# Patient Record
Sex: FEMALE | Race: BLACK OR AFRICAN AMERICAN | NOT HISPANIC OR LATINO | URBAN - METROPOLITAN AREA
[De-identification: names, ages, dates, MRNs, and addresses within clinical notes are randomized per-mention and may not be internally consistent; named-entity substitution may affect disease eponyms.]

---

## 2017-08-11 ENCOUNTER — HISTORICAL (OUTPATIENT)
Dept: ADMINISTRATIVE | Facility: HOSPITAL | Age: 22
End: 2017-08-11

## 2017-08-13 LAB — FINAL CULTURE: NORMAL

## 2019-07-15 ENCOUNTER — HOSPITAL ENCOUNTER (OUTPATIENT)
Dept: ADMINISTRATIVE | Facility: HOSPITAL | Age: 24
End: 2019-07-19
Attending: INTERNAL MEDICINE | Admitting: INTERNAL MEDICINE

## 2019-07-15 LAB
ABS NEUT (OLG): 17.22 X10(3)/MCL (ref 2.1–9.2)
ALBUMIN SERPL-MCNC: 3.2 GM/DL (ref 3.4–5)
ALBUMIN/GLOB SERPL: 0.6 RATIO (ref 1.1–2)
ALP SERPL-CCNC: 196 UNIT/L (ref 38–126)
ALT SERPL-CCNC: 23 UNIT/L (ref 12–78)
APPEARANCE, UA: CLEAR
AST SERPL-CCNC: 14 UNIT/L (ref 15–37)
B-HCG SERPL QL: NEGATIVE
BACTERIA SPEC CULT: ABNORMAL /HPF
BASOPHILS # BLD AUTO: 0 X10(3)/MCL (ref 0–0.2)
BASOPHILS NFR BLD AUTO: 0 %
BILIRUB SERPL-MCNC: 0.3 MG/DL (ref 0.2–1)
BILIRUB UR QL STRIP: NEGATIVE
BILIRUBIN DIRECT+TOT PNL SERPL-MCNC: 0.1 MG/DL (ref 0–0.5)
BILIRUBIN DIRECT+TOT PNL SERPL-MCNC: 0.2 MG/DL (ref 0–0.8)
BNP BLD-MCNC: 11 PG/ML (ref 0–125)
BUN SERPL-MCNC: 9 MG/DL (ref 7–18)
CALCIUM SERPL-MCNC: 9.7 MG/DL (ref 8.5–10.1)
CHLORIDE SERPL-SCNC: 102 MMOL/L (ref 98–107)
CO2 SERPL-SCNC: 31 MMOL/L (ref 21–32)
COLOR UR: YELLOW
CREAT SERPL-MCNC: 0.73 MG/DL (ref 0.55–1.02)
EOSINOPHIL # BLD AUTO: 0.1 X10(3)/MCL (ref 0–0.9)
EOSINOPHIL NFR BLD AUTO: 0 %
ERYTHROCYTE [DISTWIDTH] IN BLOOD BY AUTOMATED COUNT: 13.5 % (ref 11.5–17)
GLOBULIN SER-MCNC: 5.1 GM/DL (ref 2.4–3.5)
GLUCOSE (UA): NEGATIVE
GLUCOSE SERPL-MCNC: 87 MG/DL (ref 74–106)
HCT VFR BLD AUTO: 37.4 % (ref 37–47)
HGB BLD-MCNC: 11.4 GM/DL (ref 12–16)
HGB UR QL STRIP: NEGATIVE
KETONES UR QL STRIP: NEGATIVE
LEUKOCYTE ESTERASE UR QL STRIP: ABNORMAL
LIPASE SERPL-CCNC: 88 UNIT/L (ref 73–393)
LYMPHOCYTES # BLD AUTO: 1.9 X10(3)/MCL (ref 0.6–4.6)
LYMPHOCYTES NFR BLD AUTO: 9 %
MCH RBC QN AUTO: 24.9 PG (ref 27–31)
MCHC RBC AUTO-ENTMCNC: 30.5 GM/DL (ref 33–36)
MCV RBC AUTO: 81.8 FL (ref 80–94)
MONOCYTES # BLD AUTO: 1 X10(3)/MCL (ref 0.1–1.3)
MONOCYTES NFR BLD AUTO: 5 %
NEUTROPHILS # BLD AUTO: 17.22 X10(3)/MCL (ref 2.1–9.2)
NEUTROPHILS NFR BLD AUTO: 85 %
NITRITE UR QL STRIP: NEGATIVE
PH UR STRIP: 5.5 [PH] (ref 5–9)
PLATELET # BLD AUTO: 481 X10(3)/MCL (ref 130–400)
PMV BLD AUTO: 9.5 FL (ref 9.4–12.4)
POTASSIUM SERPL-SCNC: 3.8 MMOL/L (ref 3.5–5.1)
PROT SERPL-MCNC: 8.3 GM/DL (ref 6.4–8.2)
PROT UR QL STRIP: NEGATIVE
RBC # BLD AUTO: 4.57 X10(6)/MCL (ref 4.2–5.4)
RBC #/AREA URNS HPF: ABNORMAL /[HPF]
SODIUM SERPL-SCNC: 137 MMOL/L (ref 136–145)
SP GR UR STRIP: 1.02 (ref 1–1.03)
SQUAMOUS EPITHELIAL, UA: ABNORMAL
TSH SERPL-ACNC: 1.38 MIU/L (ref 0.36–3.74)
UROBILINOGEN UR STRIP-ACNC: 0.2
WBC # SPEC AUTO: 20.3 X10(3)/MCL (ref 4.5–11.5)
WBC #/AREA URNS HPF: ABNORMAL /[HPF]

## 2019-07-16 ENCOUNTER — HISTORICAL (OUTPATIENT)
Dept: LAB | Facility: HOSPITAL | Age: 24
End: 2019-07-16

## 2019-07-16 LAB
APTT PPP: 37 SECOND(S) (ref 24.2–33.9)
GLUCOSE FLD-MCNC: 79 MG/DL
INR PPP: 1.2 (ref 0–1.3)
LDH FLD-CCNC: 457 U/L
LDH SERPL-CCNC: 453 UNIT/L (ref 84–246)
PH BF TYPE: NORMAL
PH FLD: 7.78 [PH]
PLATELET # BLD AUTO: 451 X10(3)/MCL (ref 130–400)
PROT FLD-MCNC: 5 GM/DL
PROT SERPL-MCNC: 7.7 GM/DL (ref 6.4–8.2)
PROTHROMBIN TIME: 15.2 SECOND(S) (ref 12–14)

## 2019-07-17 LAB
ABS NEUT (OLG): 16.96 X10(3)/MCL (ref 2.1–9.2)
ALBUMIN SERPL-MCNC: 2.9 GM/DL (ref 3.4–5)
ALBUMIN/GLOB SERPL: 0.7 RATIO (ref 1.1–2)
ALP SERPL-CCNC: 156 UNIT/L (ref 38–126)
ALT SERPL-CCNC: 16 UNIT/L (ref 12–78)
APTT PPP: 37.2 SECOND(S) (ref 24.2–33.9)
AST SERPL-CCNC: 19 UNIT/L (ref 15–37)
BASOPHILS # BLD AUTO: 0 X10(3)/MCL (ref 0–0.2)
BASOPHILS NFR BLD AUTO: 0 %
BILIRUB SERPL-MCNC: 0.3 MG/DL (ref 0.2–1)
BILIRUBIN DIRECT+TOT PNL SERPL-MCNC: 0.1 MG/DL (ref 0–0.5)
BILIRUBIN DIRECT+TOT PNL SERPL-MCNC: 0.2 MG/DL (ref 0–0.8)
BUN SERPL-MCNC: 9 MG/DL (ref 7–18)
CALCIUM SERPL-MCNC: 8.8 MG/DL (ref 8.5–10.1)
CHLORIDE SERPL-SCNC: 105 MMOL/L (ref 98–107)
CO2 SERPL-SCNC: 24 MMOL/L (ref 21–32)
CREAT SERPL-MCNC: 0.68 MG/DL (ref 0.55–1.02)
EOSINOPHIL # BLD AUTO: 0.1 X10(3)/MCL (ref 0–0.9)
EOSINOPHIL NFR BLD AUTO: 0 %
ERYTHROCYTE [DISTWIDTH] IN BLOOD BY AUTOMATED COUNT: 13.6 % (ref 11.5–17)
GLOBULIN SER-MCNC: 4.2 GM/DL (ref 2.4–3.5)
GLUCOSE SERPL-MCNC: 80 MG/DL (ref 74–106)
GRAM STN SPEC: NORMAL
HCT VFR BLD AUTO: 33.8 % (ref 37–47)
HGB BLD-MCNC: 10.6 GM/DL (ref 12–16)
INR PPP: 1.2 (ref 0–1.3)
LYMPHOCYTES # BLD AUTO: 1.6 X10(3)/MCL (ref 0.6–4.6)
LYMPHOCYTES NFR BLD AUTO: 8 %
MAGNESIUM SERPL-MCNC: 2.1 MG/DL (ref 1.8–2.4)
MCH RBC QN AUTO: 25.1 PG (ref 27–31)
MCHC RBC AUTO-ENTMCNC: 31.4 GM/DL (ref 33–36)
MCV RBC AUTO: 79.9 FL (ref 80–94)
MONOCYTES # BLD AUTO: 1 X10(3)/MCL (ref 0.1–1.3)
MONOCYTES NFR BLD AUTO: 5 %
NEUTROPHILS # BLD AUTO: 16.96 X10(3)/MCL (ref 2.1–9.2)
NEUTROPHILS NFR BLD AUTO: 86 %
PHOSPHATE SERPL-MCNC: 4.9 MG/DL (ref 2.5–4.9)
PLATELET # BLD AUTO: 412 X10(3)/MCL (ref 130–400)
PMV BLD AUTO: 9.6 FL (ref 9.4–12.4)
POTASSIUM SERPL-SCNC: 4.6 MMOL/L (ref 3.5–5.1)
PROT SERPL-MCNC: 7.1 GM/DL (ref 6.4–8.2)
PROTHROMBIN TIME: 15.7 SECOND(S) (ref 12–14)
RBC # BLD AUTO: 4.23 X10(6)/MCL (ref 4.2–5.4)
SODIUM SERPL-SCNC: 138 MMOL/L (ref 136–145)
WBC # SPEC AUTO: 19.8 X10(3)/MCL (ref 4.5–11.5)

## 2019-07-18 LAB
ABS NEUT (OLG): 18.15 X10(3)/MCL (ref 2.1–9.2)
BASOPHILS # BLD AUTO: 0 X10(3)/MCL (ref 0–0.2)
BASOPHILS NFR BLD AUTO: 0 %
EOSINOPHIL # BLD AUTO: 0.2 X10(3)/MCL (ref 0–0.9)
EOSINOPHIL NFR BLD AUTO: 1 %
ERYTHROCYTE [DISTWIDTH] IN BLOOD BY AUTOMATED COUNT: 13.3 % (ref 11.5–17)
HCT VFR BLD AUTO: 34.4 % (ref 37–47)
HGB BLD-MCNC: 10.5 GM/DL (ref 12–16)
LYMPHOCYTES # BLD AUTO: 1.7 X10(3)/MCL (ref 0.6–4.6)
LYMPHOCYTES NFR BLD AUTO: 8 %
MCH RBC QN AUTO: 25 PG (ref 27–31)
MCHC RBC AUTO-ENTMCNC: 30.5 GM/DL (ref 33–36)
MCV RBC AUTO: 81.9 FL (ref 80–94)
MONOCYTES # BLD AUTO: 1.1 X10(3)/MCL (ref 0.1–1.3)
MONOCYTES NFR BLD AUTO: 5 %
NEUTROPHILS # BLD AUTO: 18.15 X10(3)/MCL (ref 2.1–9.2)
NEUTROPHILS NFR BLD AUTO: 85 %
PLATELET # BLD AUTO: 442 X10(3)/MCL (ref 130–400)
PMV BLD AUTO: 9.9 FL (ref 9.4–12.4)
RBC # BLD AUTO: 4.2 X10(6)/MCL (ref 4.2–5.4)
WBC # SPEC AUTO: 21.3 X10(3)/MCL (ref 4.5–11.5)

## 2019-07-19 LAB
ABS NEUT (OLG): 16.58 X10(3)/MCL (ref 2.1–9.2)
BASOPHILS # BLD AUTO: 0 X10(3)/MCL (ref 0–0.2)
BASOPHILS NFR BLD AUTO: 0 %
EOSINOPHIL # BLD AUTO: 0.2 X10(3)/MCL (ref 0–0.9)
EOSINOPHIL NFR BLD AUTO: 1 %
ERYTHROCYTE [DISTWIDTH] IN BLOOD BY AUTOMATED COUNT: 13.5 % (ref 11.5–17)
ERYTHROCYTE [SEDIMENTATION RATE] IN BLOOD: 53 MM/HR (ref 0–20)
FINAL CULTURE: NORMAL
HAV IGM SERPL QL IA: NEGATIVE
HBV CORE IGM SERPL QL IA: NEGATIVE
HBV SURFACE AG SERPL QL IA: NEGATIVE
HCT VFR BLD AUTO: 35.1 % (ref 37–47)
HCV AB SERPL QL IA: NEGATIVE
HEPATITIS PANEL INTERP: NORMAL
HGB BLD-MCNC: 10.7 GM/DL (ref 12–16)
HIV 1+2 AB+HIV1 P24 AG SERPL QL IA: NEGATIVE
LYMPHOCYTES # BLD AUTO: 1.9 X10(3)/MCL (ref 0.6–4.6)
LYMPHOCYTES NFR BLD AUTO: 9 %
MCH RBC QN AUTO: 24.7 PG (ref 27–31)
MCHC RBC AUTO-ENTMCNC: 30.5 GM/DL (ref 33–36)
MCV RBC AUTO: 80.9 FL (ref 80–94)
MONOCYTES # BLD AUTO: 1 X10(3)/MCL (ref 0.1–1.3)
MONOCYTES NFR BLD AUTO: 5 %
NEUTROPHILS # BLD AUTO: 16.58 X10(3)/MCL (ref 2.1–9.2)
NEUTROPHILS NFR BLD AUTO: 84 %
PLATELET # BLD AUTO: 462 X10(3)/MCL (ref 130–400)
PMV BLD AUTO: 9.5 FL (ref 9.4–12.4)
RBC # BLD AUTO: 4.34 X10(6)/MCL (ref 4.2–5.4)
WBC # SPEC AUTO: 19.7 X10(3)/MCL (ref 4.5–11.5)

## 2019-07-21 LAB
FINAL CULTURE: NORMAL

## 2019-08-19 LAB — FINAL CULTURE: NORMAL

## 2022-04-10 ENCOUNTER — HISTORICAL (OUTPATIENT)
Dept: ADMINISTRATIVE | Facility: HOSPITAL | Age: 27
End: 2022-04-10

## 2022-04-11 ENCOUNTER — HISTORICAL (OUTPATIENT)
Dept: ADMINISTRATIVE | Facility: HOSPITAL | Age: 27
End: 2022-04-11

## 2022-04-28 VITALS
SYSTOLIC BLOOD PRESSURE: 121 MMHG | OXYGEN SATURATION: 98 % | DIASTOLIC BLOOD PRESSURE: 62 MMHG | BODY MASS INDEX: 23.25 KG/M2 | WEIGHT: 157 LBS | HEIGHT: 69 IN

## 2022-04-28 VITALS
BODY MASS INDEX: 23.25 KG/M2 | SYSTOLIC BLOOD PRESSURE: 121 MMHG | DIASTOLIC BLOOD PRESSURE: 62 MMHG | HEIGHT: 69 IN | OXYGEN SATURATION: 98 % | WEIGHT: 157 LBS

## 2022-04-30 NOTE — CONSULTS
Patient:   Milady Brown            MRN: [id number]            FIN: [id number]               Age:   24 years     Sex:  Female     :  1995   Associated Diagnoses:   None   Author:   Konstantin Armenta MD      Basic Information   24-year-old black female with no significant medical history presented to the ER 7/15/19 with a one-week history of left sided lower back pain and shortness of breath with activity. She denied any fever, chills, night sweats or weight loss. She denied cough or sputum production. UPT was negative. CT of the abdomen and pelvis showed a large left-sided pleural effusion and small pericardial effusion. There was a large left para-aortic density measuring 5.8 x 6 cm felt to represent pathologic adenopathy. There was also extensive adenopathy in the mesentery. CT of the chest showed prominent adenopathy in the lower neck, supraclavicular, left subpectoral and bilateral axilla. Representative right neck node measured 2.8 x 1.2 cm, left supraclavicular 3.3 x 2.1 cm, left subpectoral 4.2 x 2.6 cm and left axillary 3.2 x 2.5 cm. There was significant mediastinal adenopathy and also gastrohepatic adenopathy. There were several hypodense splenic lesions. Laboratory on admission showed a leukocytosis with a white count of 20.3 with a left shifted differential and mild anemia with a hemoglobin of 11.4. LDH level was significantly elevated at 453 u/l. She underwent a left thoracentesis 19 with removal of 1100 mL of non-cloudy yellow fluid. Chemistries showed a lymphocyte predominant exudative pleural fluid with negative cytology. CT-guided biopsy of a left retroperitoneal node 19 was positive for Hodgkin lymphoma, classical type. Due to the small sample size, further subclassification could not be performed. Echocardiogram 19 showed a very small pericardial effusion and normal left ventricular function with an ejection fraction of 55-60%. I was consulted for a Medical Oncology  opinion.    Patient sitting up on the side of bed. She feels much better following thoracentesis. She is not having any further chest pain. She thoroughly denies any B symptoms. She felt perfectly fine until approximately one week ago. 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      Chief Complaint   Left-sided chest pain and shortness of breath      Review of Systems   Constitutional:  No significant weight loss, No fever, No chills, No sweats, No weakness, No fatigue.    Eye:  No icterus, No blurring, No double vision.    Ear/Nose/Mouth/Throat:  No nasal congestion, No sore throat.    Respiratory:  No shortness of breath, No cough, No sputum production, No hemoptysis, No wheezing.    Cardiovascular:  No chest pain, No palpitations, No tachycardia.    Gastrointestinal:  No nausea, No vomiting, No diarrhea, No constipation, No abdominal pain.    Genitourinary:  No dysuria, No hematuria, No change in urine stream.    Hematology/Lymphatics:  No bruising tendency, No bleeding tendency, No swollen lymph glands.    Musculoskeletal:  No lower extremity swelling, No back pain, No joint pain.    Integumentary:  No rash, No pruritus, No skin lesion.    Neurologic:  Alert and oriented X4, No abnormal balance, No confusion, No numbness, No tingling.    Psychiatric:  No anxiety, No depression.      PMHX:  ? Sjogren syndrome (recent diagnosis - rheumatology appt pending), ADHD on Adderall    PSHX:  No previous surgeries. Lost tip of right fifth finger age 6 due to an accident    SH:  Lifetime nonsmoker, occasional social alcohol use. Lives with her mother and 2-year-old son in [place]. 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    FH:  Her maternal grandmother and grandfather both had colon cancer.      Health Status   Allergies:    Allergic Reactions (Selected)  Severity Not Documented  Penicillins- Unk.   Current medications:    Medications (5) Active  Scheduled: (1)  pantoprazole 40 mg EC Tab   40 mg 1 tab(s), Oral, Daily  Continuous: (1)  sodium chloride 0.9% 1,000 mL  Bolus  1,000 mL, IV  PRN: (3)  hydrALAzine 20 mg/ml Inj- 1 mL  10 mg 0.5 mL, IV, q4hr  lactulose 10 g/15 mL Syr UD  20 gm 30 mL, Oral, TID  ondansetron 2 mg/mL inj - 2mL  4 mg 2 mL, IV Push, q4hr        Physical Examination      Vital Signs (last 24 hrs)_____  Last Charted___________  Temp Oral     37 DegC  (JUL 19 07:28)  Heart Rate Peripheral  96 bpm  (JUL 19 07:28)  Resp Rate         H 48br/min  (JUL 19 06:00)  SBP      105 mmHg  (JUL 19 07:28)  DBP      71 mmHg  (JUL 19 07:28)  SpO2      100 %  (JUL 19 07:28)     General:  Alert and oriented, Well-developed, well-nourished healthy-appearing young black female in no acute distress.    Eye:  Pupils are equal, round and reactive to light, Extraocular movements are intact, Normal conjunctiva, Sclera nonicteric.    HENT:  Normocephalic, Oral mucosa is moist, No pharyngeal erythema.    Neck:  Supple, Non-tender, Bilateral 2-3 cm cervical and supraclavicular nodes.    Respiratory:  Decreased breath sounds and dullness to percussion left base, otherwise clear. Right lung clear. No wheezes.    Cardiovascular:  Regular rhythm, No murmur, Slightly tachycardic.    Gastrointestinal:  Soft, Non-tender, Non-distended, Normal bowel sounds, No palpable masses or splenomegaly.    Lymphatics:  Bilateral cervical and supra-clavicular adenopathy as above, 2 cm left axillary node. No right axillary or inguinal palpable adenopathy.    Musculoskeletal:  Normal range of motion, No tenderness, No lower extremity edema.    Integumentary:  Warm, Dry, No rash.    Neurologic:  Alert, Oriented, Normal sensory, Normal motor function, No focal deficits.       Review / Management   Laboratory Results   Last 10 Days Lab Results : PowerNote Discrete Results   7/17/2019 4:25 CDT       WBC                       19.8 x10(3)/mcL  HI                             RBC                       4.23 x10(6)/mcL                              Hgb                       10.6 gm/dL  LOW                             Hct                       33.8 %  LOW                             Platelet                  412 x10(3)/mcL  HI                             MCV                       79.9 fL  LOW                             MCH                       25.1 pg  LOW                             MCHC                      31.4 gm/dL  LOW                             RDW                       13.6 %                             MPV                       9.6 fL                             Abs Neut                  16.96 x10(3)/mcL  HI                             Neutro Auto               86 %  NA                             Lymph Auto                8 %  NA                             Mono Auto                 5 %  NA                             Eos Auto                  0 %  NA                             Abs Eos                   0.1 x10(3)/mcL                             Basophil Auto             0 %  NA                             Abs Neutro                16.96 x10(3)/mcL  HI                             Abs Lymph                 1.6 x10(3)/mcL                             Abs Mono                  1.0 x10(3)/mcL                             Abs Baso                  0.0 x10(3)/mcL                             PT                        15.7 second(s)  HI                             INR                       1.2                             PTT                       37.2 second(s)  HI                             Sodium Lvl                138 mmol/L                             Potassium Lvl             4.6 mmol/L                             Chloride                  105 mmol/L                             CO2                       24.0 mmol/L                             Calcium Lvl               8.8 mg/dL                             Magnesium Lvl             2.1 mg/dL                             Glucose Lvl               80 mg/dL                             BUN                        9.0 mg/dL                             Creatinine                0.68 mg/dL                             eGFR-AA                   >60 mL/min/1.73 m2  NA                             eGFR-LILLIAM                  >60 mL/min/1.73 m2  NA                             Bili Total                0.3 mg/dL                             Bili Direct               0.10 mg/dL                             Bili Indirect             0.20 mg/dL                             AST                       19 unit/L                             ALT                       16 unit/L                             Alk Phos                  156 unit/L  HI                             Total Protein             7.1 gm/dL                             Albumin Lvl               2.90 gm/dL  LOW                             Globulin                  4.20 gm/dL  HI                             A/G Ratio                 0.7 ratio  LOW                             Phosphorus                4.9 mg/dL    7/16/2019 23:56 CDT      Urine Culture             Review  (In Progress)   7/16/2019 11:02 CDT      Total Protein             7.7 gm/dL                             LDH                       453 unit/L  HI    7/16/2019 8:33 CDT       Gluc BF Type              Thoracent fld                             Glucose BF                79 mg/dL  NA                             LDH BF Type               Thoracent fld                             LDH BF                    457  NA                             pH BF Type                Pleural fluid                             pH BF                     7.78  NA                             Prot BF Type              Pleural fluid                             Protein BF                5.0 gm/dL  NA        Final Diagnosis  LEFT RETROPERITONEAL LYMPH NODE, CT GUIDED FINE NEEDLE BIOPSY:    HODGKIN LYMPHOMA, CLASSICAL TYPE (SEE COMMENT).     IMMUNOPHENOTYPE OF HODGKIN CELLS:       Positive for CD15, CD30 and PAX-5, negative for  "CD45.    IMMUNOREACTION RESULTS (performed on block 1B):    CD15:  Positive in Hodgkin cells.   CD30:  Positive in Hodgkin cells.  PAX-5:  Positive in Hodgkin cells.   CD45:  Negative in Hodgkin cells.     IMMUNOREACTION RESULTS (performed on block 1A):     CD3:  Highlights background T cell population.   CD5:  Staining pattern roughly parallels that of CD3.   CD20: Highlights rare background small lymphocytes.   CD10:  Highlights rare small lymphocytes.     Control reactions are appropriate.    COMMENT:      A.  The small size of the biopsy precludes further subclassification.  However, nodular sclerosis variant is favored.    B.  This case was discussed with Dr. Sanderson on 7/18/2019.     C.  Intradepartmental consult case.     CODE 9      *Electronically Signed*     Mj Hurst MD  Verified: 07/18/19 16:16        Specimen Description  1 Left Retroperitoneal Lymph Node    Clinical Information       Pre-Operative Diagnosis:     lt retroperitoneal lymph node    Intraoperative Diagnosis  ATYPICAL CELLS PRESENT.  RECOMMEND ADDITIONAL CORE BIOPSIES FOR HISTOLOGIC / IMMUNOHISTOCHEMICAL EVALUATION./ FRANK     (TWO SMEARS)  15:08-15:18      Consult Performed By:  FRANK/CM    Gross Description  Received for immediate read diagnosis are two smears. One air dried which submitted for Hull stain and used for immediate read diagnosis. One alcohol fix which is submitted for Pap stain and used for immediate read diagnosis. Additionally received is RPMI solution with cylindrical cores which are submitted for flow cytometry.  Additionally received labeled "CT biopsy left retroperitoneal lymph node, Milady Brown, [id number]" are multiple cylindrical cores and core pieces of gray-white tissue measuring 0.6 x less than 0.1 x less than 0.1 cm in aggregate. The specimen is placed on filter paper and entirely submitted in three cassettes labeled [id number], 1B, and 1C.  0-f-3 conserve all tissue.      RLM/ANA    Microscopic Description  Microscopic " examination performed.  Please see diagnosis.         Impression and Plan     IMPRESSION:  Hodgkin's lymphoma, classical type (not further classifiable by FNA) - clinical stage at least IIIA  Anemia  Leukocytosis   Left pleural effusion secondary to extensive mediastinal adenopathy      PLAN:  The diagnosis, prognosis and treatment options for advanced stage classical Hodgkin's lymphoma were reviewed and discussed with the patient.  Her presenting symptoms were mostly due to her large left pleural effusion and have largely resolved post thoracentesis. She has no evidence of B symptoms.  Will draw additional laboratory including a sedimentation rate, hepatitis panel and HIV.  Case discussed with Surgical Oncology. She will be scheduled next week for an outpatient excisional lymph node biopsy, MediPort catheter placement and bone marrow aspirate and biopsy.  She will also be scheduled for a CT PET scan to complete her staging evaluation.  She has already had an echocardiogram showing normal left ventricular systolic function.  Given her advanced stage disease, anticipate treatment with Adcetris + AVD at [facility].  She can be discharged to home today once outpatient appointments have been made.      JENNIFER FARIAS M.D.

## 2022-04-30 NOTE — ED PROVIDER NOTES
Patient:   Milady Brown            MRN: [id number]            FIN: [id number]               Age:   24 years     Sex:  Female     :  1995   Associated Diagnoses:   Pleural effusion, left; Pericardial effusion; Adenopathy; Cardiac mass   Author:   Beth Penaloza      Basic Information   Time seen: Date & time 7/15/2019 19:29:00.   History source: Patient.   Arrival mode: Private vehicle, walking.   History limitation: None.   Additional information: Patient's physician(s): JACQUIE Patel III, Jimmy, Chief Complaint from Nursing Triage Note : Chief Complaint   7/15/2019 19:28 CDT      Chief Complaint           c/o abd pain to LUQ that radiates to back for 1 week. also c/o nausea and constipation. denies dysuria.  .      History of Present Illness   The patient presents with abdominal pain and 24-year-old black female with left upper quadrant pain rating around to back worse when laying flat and radiates to shoulder.  Mild nausea occasional shortness of breath when laying flat. No fever, No cough. Hx of constipation 2 weeks with onset of abd pain 1 week after this. Last BM this am small normal.  Stacey Blackwood, GRETTA SORT NOTE    Female who reports left upper quadrant pain over the past week sharp and constant.  Patient reports over the past 2 weeks having constipation improved with laxative.  Patient reports recently diagnosed with Sjogren's syndrome taking ibuprofen large doses for hand and wrist pain.  Patient reports last menstrual period 3 weeks ago currently denies vaginal bleeding dysuria hematuria.  Patient presents to ER for initial evaluation of abdominal pain..  The onset was 1  weeks ago.  The course/duration of symptoms is constant.  The character of symptoms is sharp.  The degree at onset was moderate.  The Location of pain at onset was left, upper and abdominal.  The degree at present is moderate.  The Location of pain at present is left, upper and abdominal.  The exacerbating factor  is eating.  Therapy today: over the counter medications including Ibuprofen and no doctor's office visit.  Associated symptoms: nausea, denies vomiting, denies diarrhea, denies fever and denies chills.        Review of Systems   Constitutional symptoms:  No fever, no chills, no weakness, no fatigue.    Skin symptoms:  No rash, no pruritus, no abrasions.    ENMT symptoms:  Negative except as documented in HPI.   Respiratory symptoms:  No shortness of breath, no stridor.    Cardiovascular symptoms:  Negative except as documented in HPI.   Gastrointestinal symptoms:  Abdominal pain, nausea, constipation, no vomiting, no diarrhea.    Genitourinary symptoms:  No dysuria, no hematuria.    Musculoskeletal symptoms:  Negative except as documented in HPI.   Neurologic symptoms:  Negative except as documented in HPI.   Psychiatric symptoms:  Anxiety, No sleeping problems,    Endocrine symptoms:  Negative except as documented in HPI.   Hematologic/Lymphatic symptoms:  Negative except as documented in HPI.      Health Status   Allergies:    Allergic Reactions (Selected)  Severity Not Documented  Penicillins- Unk.,    Allergies (1) Active Reaction  penicillins unk  .   Medications:  (Selected)   Documented Medications  Documented  amphetamine-dextroamphetamine 30 mg oral tablet: 30 mg = 1 tab(s), Oral, Daily.      Past Medical/ Family/ Social History   Medical history:    Active  ADHD - Attention deficit disorder with hyperactivity ([id number]).   Surgical history:    none..   Family history:    No family history items have been selected or recorded..   Social history:    Social & Psychosocial Habits    Alcohol  08/11/2017  Use: Never    Substance Use  08/11/2017  Use: Never    Tobacco  08/11/2017  Use: Never smoker    03/03/2019  Use: Never (less than 100 in l    Patient Wants Consult For Cessation Counseling N/A  .      Physical Examination               Vital Signs   Vital Signs   7/15/2019 19:28 CDT      Temperature Oral           37.1 DegC                             Temperature Oral (calculated)             98.78 DegF                             Peripheral Pulse Rate     102 bpm  HI                             Respiratory Rate          20 br/min                             SpO2                      98 %                             Oxygen Therapy            Room air                             Systolic Blood Pressure   105 mmHg                             Diastolic Blood Pressure  70 mmHg  .   Oxygen saturation.   General:  Alert, no acute distress, Awake alert ambulatory female resting in bed.    Skin:  Warm, dry, pink, no rash, normal for ethnicity.    Head:  Normocephalic, atraumatic.    Neck:  Supple, trachea midline, no tenderness.    Eye:  Normal conjunctiva.   Ears, nose, mouth and throat:  Oral mucosa moist, no pharyngeal erythema or exudate.    Cardiovascular:  Regular rate and rhythm, Normal peripheral perfusion.    Respiratory:  Lungs are clear to auscultation, respirations are non-labored, breath sounds are equal, Symmetrical chest wall expansion.    Chest wall:  No deformity.   Back:  Nontender, Normal range of motion, no CVA tenderness.    Musculoskeletal:  Normal ROM, normal strength, no tenderness, no deformity.    Gastrointestinal:  Soft, Non distended, Normal bowel sounds, Tenderness: Moderate, epigastric, left upper quadrant, right flank negative, left flank negative, right upper quadrant negative, right lower quadrant negative, left lower quadrant negative, Guarding: Involuntary, Rebound: Negative.    Neurological:  Alert and oriented to person, place, time, and situation, No focal neurological deficit observed, normal sensory observed, normal motor observed, normal speech observed.    Psychiatric:  Cooperative, mild anxiety.       Medical Decision Making   Differential Diagnosis:  Abdominal pain, nausea; left rib/chest pain.    Documents reviewed:  Emergency department nurses' notes, emergency department records.     Orders  Launch Orders   Patient Care:  IV Fluids (Order)  Pharmacy:  Protonix 40 mg Vial (IV Push) (Order): 40 mg, form: Injection, IV Slow, Once, Infuse over: 2 minute(s), first dose 7/15/2019 20:16 CDT, stop date 7/15/2019 20:16 CDT, STAT  IVF Normal Saline NS Bolus 1000ml (Order): 1,000 mL, 1,000 mL, IV, 1,000 mL, start date 7/15/2019 20:16 CDT, Launch Orders   Laboratory:  Lipase Level (Order): Stat collect, 7/15/2019 20:17 CDT, Blood, Lab Collect, Print Label By Order Location, 7/15/2019 20:17 CDT  Radiology:  CT Abdomen and Pelvis W Contrast (Order): Stat, 7/15/2019 20:17 CDT, Abdominal Pain, left sided ab pain, None, Stretcher, Rad Type, Launch Orders   Admit/Transfer/Discharge:  Admit as Inpatient (Order): 7/15/2019 22:41 CDT, Telemetry with Monitor Sal LECHUGA, Ramses HENRY, Pleural effusion, left  Pericardial effusion  Adenopathy  Cardiac mass, No, launch Order Profile (Selected)   Inpatient Orders  Ordered (In-Lab)  CMP: STAT collect, 07/15/19 19:50:17 CDT, BLOOD, Collected, Stop date 07/15/19 19:50:00 CDT, Lab Collect  UA a reflex to culture: Stat collect, Urine, 07/15/19 19:30:00 CDT, Stop date 07/15/19 19:30:00 CDT, Nurse collect, Print Label By Order Location  UPT - Urine Pregancy Test: Stat collect, Urine, 07/15/19 19:30:00 CDT, Stop date 07/15/19 19:30:00 CDT, Nurse collect, 07/15/19 19:30:00 CDT  Completed  Automated Diff: STAT collect, 07/15/19 19:50:00 CDT, Blood, Collected, Stop date 07/15/19 19:50:00 CDT, Lab Collect, Print Label By Order Location, 07/15/19 19:30:00 CDT  CBC - includes Diff: STAT collect, 07/15/19 19:50:17 CDT, BLOOD, Collected, Stop date 07/15/19 19:50:00 CDT, Lab Collect.    Results review:  Lab results : Lab View   7/15/2019 19:50 CDT      Sodium Lvl                137 mmol/L                             Potassium Lvl             3.8 mmol/L                             Chloride                  102 mmol/L                             CO2                       31.0 mmol/L                              Calcium Lvl               9.7 mg/dL                             Glucose Lvl               87 mg/dL                             BUN                       9.0 mg/dL                             Creatinine                0.73 mg/dL                             eGFR-AA                   >60 mL/min/1.73 m2  NA                             eGFR-LILLIAM                  >60 mL/min/1.73 m2  NA                             Bili Total                0.3 mg/dL                             Bili Direct               0.10 mg/dL                             Bili Indirect             0.20 mg/dL                             AST                       14 unit/L  LOW                             ALT                       23 unit/L                             Alk Phos                  196 unit/L  HI                             Total Protein             8.3 gm/dL  HI                             Albumin Lvl               3.20 gm/dL  LOW                             Globulin                  5.10 gm/dL  HI                             A/G Ratio                 0.6 ratio  LOW                             Lipase Lvl                88 unit/L                             WBC                       20.3 x10(3)/mcL  HI                             RBC                       4.57 x10(6)/mcL                             Hgb                       11.4 gm/dL  LOW                             Hct                       37.4 %                             Platelet                  481 x10(3)/mcL  HI                             MCV                       81.8 fL                             MCH                       24.9 pg  LOW                             MCHC                      30.5 gm/dL  LOW                             RDW                       13.5 %                             MPV                       9.5 fL                             Abs Neut                  17.22 x10(3)/mcL  HI                             Neutro Auto               85 %   NA                             Lymph Auto                9 %  NA                             Mono Auto                 5 %  NA                             Eos Auto                  0 %  NA                             Abs Eos                   0.1 x10(3)/mcL                             Basophil Auto             0 %  NA                             Abs Neutro                17.22 x10(3)/mcL  HI                             Abs Lymph                 1.9 x10(3)/mcL                             Abs Mono                  1.0 x10(3)/mcL                             Abs Baso                  0.0 x10(3)/mcL    7/15/2019 19:34 CDT      U Beta hCG Ql             Negative                             UA Appear                 CLEAR                             UA Color                  YELLOW                             UA Spec Grav              1.019                             UA Bili                   Negative                             UA pH                     5.5                             UA Urobilinogen           0.2                             UA Blood                  Negative                             UA Glucose                Negative                             UA Ketones                Negative                             UA Protein                Negative                             UA Nitrite                Negative                             UA Leuk Est               Trace                             UA WBC                    NONE SEEN                             UA RBC                    NONE SEEN                             UA Bacteria               NONE SEEN /HPF                             UA Squam Epithelial       NONE SEEN  .   Radiology results:  Rad Results (ST)  < 12 hrs   Accession: [id number]  Order: CT Abdomen and Pelvis W Contrast  Report Dt/Tm: 07/15/2019 21:38  Report:   CT of the abdomen and pelvis without oral after IV contrast only     Clinical history is abdominal pain     There is a large  left-sided pleural effusion with associated  atelectasis. There is a small pericardial effusion. There is a left  periaortic density measuring 5.8 x 6 cm this may represent adenopathy.  There are several focal lesions in the spleen the first measures 11 mm  the second measures 15. Pancreas appears normal. Biliary system  appears normal. Kidneys appear normal. The adrenals are not enlarged.  Aorta shows no evidence of an aneurysm. There is a small amount of  free fluid in the pelvis. Uterus appears normal. The appendix appears  normal. There is significant periaortic and base of the mesentery  adenopathy.     IMPRESSION: Large left-sided pleural effusion.     Small pericardial effusion.     Left Cardiac mass most likely represents adenopathy.     Extensive adenopathy in the mesentery and periaortic region.  Differential would include metastatic disease and lymphoproliferative  disease      .      Reexamination/ Reevaluation   Vital signs   results included from flowsheet : Vital Signs   7/15/2019 20:36 CDT      Peripheral Pulse Rate     99 bpm                             SpO2                      99 %                             Oxygen Therapy            Room air                             Systolic Blood Pressure   113 mmHg                             Diastolic Blood Pressure  76 mmHg                             Mean Arterial Pressure, Cuff              88 mmHg     Notes: [facility] patient is a 25 y/o female who presents with nausea x 1 week with left upper abdomen/chest area pain, sob with lying down. no fever. states +night sweats. sjorgen's and adhd only medical history. no vomiting. irregular bowel habits over past 1-2 week.s family hx of lupus. no cp.    discussed results of CT with patient. all questions answered. .      Impression and Plan   Diagnosis   Pleural effusion, left (KSE58-HH J90)   Pericardial effusion (RBP93-OB I31.3)   Adenopathy (EKV03-PO R59.1)   Cardiac mass  (COY48-RB I51.89)      Calls-Consults   -  Recommends spoke with [facility]. accepts admission. .   Plan   Condition: Stable.    Disposition: Admit time  7/15/2019 22:43:00, Admit to Inpatient Unit, Patient care transitioned to: Time: 7/15/2019 20:59:00, Beth Penaloza.    Notes: spoke with dr. john regarding patient, symptoms, lab work and CT. she had face to face interaction with patient. .

## 2022-04-30 NOTE — CONSULTS
DATE OF CONSULTATION:      ATTENDING PHYSICIAN:  Ramses Huang MD  CONSULTING PHYSICIAN:  Gia Sanderson Jr., MD    HISTORY OF PRESENT ILLNESS:  The patient is a 24-year-old black female who presented yesterday with complaints of left upper quadrant abdominal pain that radiates through to the back, has been occurring about 1 week, and appears to be worsening.  It does not have an obvious pleuritic or positional component, and she cannot relate the pain to any exertion or activity.  She is noted afebrile since admission.  O2 saturations have remained 96+ percent room air.  She denies any significant cough.  No sputum production.  She admits to some nausea and no vomiting.  She recently was apparently diagnosed with Sjogren syndrome by her primary care physician and was referred to a rheumatologist and has an appointment in [place] sometime in August.  She is not receiving any treatment for this.  She admits to some arthralgias of the hands, knees and shoulders and states that she had a recent rash, left lower extremity and her back.  CT of the abdomen was performed in the ER demonstrating a normal pancreas, normal liver and biliary system.  Kidneys appeared normal with a small amount of free fluid in the pelvis.  She is noted to have a large left pleural effusion and a small pericardial effusion.  Consult is placed for further assistance from a pulmonary aspect.    ALLERGIES:  She has no known drug allergies.    CURRENT MEDICATIONS:  Include Adderall.    PAST MEDICAL HISTORY:  Significant for Sjogren syndrome recently diagnosed with no specific details concerning, and as above, has a rheumatology initial appointment for this August.  She has a history of ADHD on Adderall.  She is post 1 normal spontaneous vaginal delivery without complications.  No past surgical history.    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  States that she has had negative PPDs.  She is a lifetime  nonsmoker.  Occasional ethanol, not on a regular basis.    FAMILY HISTORY:  She has 1 son, no medical problems.  One sister, no medical problems.    REVIEW OF SYSTEMS:  INFECTIOUS DISEASE:  No fever, chills, history of tuberculosis or known exposure to tuberculosis.  Reports negative PPDs.   GASTROINTESTINAL:  No vomiting.  She admits to nausea.  No hematemesis, hematochezia, melena, or coffee-ground emesis.   GENITOURINARY:  No dysuria or hematuria.   HEMATOLOGIC:  No history of blood clots or bleeding dyscrasias.   RHEUMATOLOGIC:  As above in HPI.    PHYSICAL EXAMINATION:  VITAL SIGNS:  Blood pressure 106/70, pulse 96, respirations 16, SaO2 of 96%.   GENERAL:  The patient is awake, alert, and oriented.  No respiratory difficulty.  Resting comfortably.   NECK: Veins appear physiologic.  No cervical, supraclavicular or infraclavicular lymphadenopathy.   CHEST:  With decreased breath sounds of left posterior base, which is also dull to percussion with E to A changes.   ABDOMEN:  Soft, nondistended.  No organomegaly.  No tenderness.   EXTREMITIES:  No warmth, erythema, clubbing, cyanosis, edema or skin rashes.    LABORATORY DATA:  Sodium 137, potassium 3.8, BUN 9, creatinine 0.7, AST 14, ALT 23, alkaline phosphatase 196, TSH 1.38.  White blood cell count 20.3, H and H are 11.4 and 37.4 and platelets 481,000.  Urinalysis with no protein, no white cells and no red cells.    RADIOLOGIC STUDIES:  Chest x-ray on 07/16/2019, my reading of images:  There is opacification of the left base with silhouetting of the left hemidiaphragm and minimal linear atelectasis in the left mid and upper lung field.  The right paratracheal suprahilar region appears full.  AP window also straightened.  Right hemithorax appears clear.       CT of abdomen, chest portion reviewed.  There is a left pleural effusion that is incompletely imaged due to being an abdominal study, which results in compensatory atelectasis of the left lower lobe and  bottom imaged portion of the lingula.  There is a heterogeneous attenuation noted in the anterior pleural space that is also incompletely imaged measuring 6.3 x 5.9 cm of undetermined etiology, suspicious for a soft tissue attenuation.  Abuts the left cardiac ventricle.    IMPRESSION:    1. Large left pleural effusion with compensatory atelectasis of left lower lobe and left lingula with a heterogeneous 6+ cm mass attenuation noted abutting the left cardiac structures in the anterior left hemithorax of undetermined etiology.  This is incompletely visualized due to being an abdominal study.  Differential diagnostic possibilities include various inflammatory, infectious etiologies as well as malignancy.  Chest x-ray also demonstrates possible right paratracheal adenopathy and anteroposterior window adenopathy.  2. History of attention deficit hyperactivity disorder, on Adderall.    RECOMMENDATIONS:    1. I have discussed the above findings in detail with the patient this a.m.  I have recommended proceeding to thoracentesis now.  I have examined the left hemithorax with ultrasound revealing a large left pleural effusion.  2. Will add CT of chest with contrast after thoracentesis this a.m.  3. Will obtain echocardiogram with findings of possible small pericardial effusion.  4. Will have further to follow after above.        ______________________________  Gia Sanderson Jr., MD JR/SANTOS  DD:  07/16/2019  Time:  08:02AM  DT:  07/16/2019  Time:  08:23AM  Job #:  [id number]

## 2022-04-30 NOTE — OP NOTE
Patient:   Milady Brown            MRN: [id number]            FIN: [id number]               Age:   24 years     Sex:  Female     :  1995   Associated Diagnoses:   None   Author:   Gia Sanderson MD      Operative Note   Operative Information   Date/ Time:  2019 08:29:00.     Preoperative Diagnosis: Large left pleural effusion with left pleural based mass.     Postoperative Diagnosis: Large left pleural effusion with left pleural based mass.     Surgeon: Gia Sanderson MD.     Anesthesia: Xylocaine 1% without epinephrine local.     Esimated blood loss: No blood loss.     Complications: None.     Proper informed consent was obtained and signed by patient. She was placed in a sitting position. Ultrasound used to localize a large left pleural effusion, posterior chest was marked left. Chest was prepped with chlorhexidine, allowed to dry, and sterile drape applied. Xylocaine 1% without epinephrine, 3 mL used to anesthetize skin, over rib and into pleural space with a 22-gauge needle, with return of yellow non-cloudy pleural fluid. A #11 blade was used to make skin punch, and a 14-gauge thoracentesis needle was inserted over rib, into pleural space, and fed without resistance. Vacutainer bottle used to remove 1100 mL of non-cloudy yellow fluid. No complications.

## 2022-05-03 NOTE — HISTORICAL [facility] C[facility]ER
This is a historical note converted from [employer]. Formatting and pictures may have been removed.  Please reference [employer] for original formatting and attached multimedia. Admit and Discharge Dates  Admit Date: 07/15/2019  Discharge Date: 07/19/2019  Physicians  Attending Physician - Sal LECHUGA, Ramses HENRY  Admitting Physician - Sal LECHUGA, Ramses HENRY  Consulting Physician - Maria E LECHUGA, Gia  Consulting Physician - Geovany LECHUGA, Konstantin  Primary Care Physician - Jorge LANDA, FNP, Kilo  Discharge Diagnosis  1.?Hodgkin lymphoma, unspecified, lymph nodes of multiple sites?C81.98  2.?Mediastinal adenopathy?R59.0  Abdominal pain?[id number]  Adenopathy?R59.1  Pericardial effusion?I31.3  Pleural effusion, left?J90  Surgical Procedures  No procedures recorded for this visit.  Immunizations  No immunizations recorded for this visit.  Hospital Course  24-year-old female with no significant past medical history except for ADHD presented to the ED on 7/15/2019 with a one-week history of left-sided lower abdominal back pain and shortness of breath with activity.? CT revealed a large left-sided pleural effusion with small pericardial effusion and the patient had a left aortic density with associated extensive adenopathy in the mesentery and also in the chest.? Patient also had lymphadenopathy is in the cervical, supraclavicular and setup pectoral and axillary region.? She also had multiple hypodense splenic lesions.? Labs were significant for leukocytosis and mild anemia.? LDH was elevated.? Patient underwent left thoracentesis, removed 1 L fluid fluid study was negative and cytology.? A CT-guided biopsy of the left retroperitoneal node was done on 7/16/2019 which came back positive for Hodgkins lymphoma, classical type.? She also had an echocardiogram which showed intact ejection fraction but with a very small pericardial effusion.? Once pathology results were obtained hematology was consulted.? Hematology  evaluated the patient on 7/19/2019.? She is symptomatically stable.? Hemodynamically stable.? Respiratory status stable on room air.? No active complaints.? Hematology recommended outpatient follow-up.? Additional lab works ordered before discharge.? She will need to follow-up with surgical oncology for excisional lymph node biopsy, MediPort catheter placement and bone marrow aspirate and biopsy.? PET/CT will be scheduled as outpatient.? Medical oncology will call the patient with appointment once all results are available to initiate treatment.? Patient was cleared for discharge by medical oncology.? Given that she was symptomatically and hemodynamically stable and was cleared by hematology/oncology she was discharged in stable condition to home.? Discharge medications per med rec.? All treatments discussed with the patient and she is in good spirits.? Shell follow up with oncology and also as recommended above?in addition to following with?PCP,?pulmonology.  Patient also recently diagnosed with?? sjogrens . ?She is awaiting rheumatology appointment for the same  Time Spent on discharge  36 minutes  Objective  Vitals & Measurements  T:?37? ?C (Oral)? TMIN:?36.6? ?C (Oral)? TMAX:?37.1? ?C (Oral)? HR:?96(Peripheral)? RR:?48? BP:?105/71? SpO2:?100%?  Physical Exam  General: ?Alert and oriented, No acute distress. ?  Eye: ?Pupils are equal, round and reactive to light,?  HENT: ?Normocephalic,?  Neck: ?Supple. ?  Respiratory: ?Lungs are clear to auscultation, Respirations are non-labored. ?  Cardiovascular: ?Normal rate, Regular rhythm, No edema. ?  Gastrointestinal: ?Soft, Non-tender, Normal bowel sounds  Integumentary: ?Warm,?  Neurologic: ?Alert, Oriented, no focal deficits  Psychiatric: ?Cooperative,??  ?  ?  ?  Patient Discharge Condition  Stable  Discharge Disposition  Home   Discharge Medication Reconciliation  Prescribed  lactulose (lactulose 10 g/15 mL oral syrup)?10 gm, Oral, BID, PRN as needed for  constipation  Continue  amphetamine-dextroamphetamine (amphetamine-dextroamphetamine 30 mg oral tablet)?30 mg, Oral, Daily  Education and Orders Provided  Hodgkin Lymphoma, Adult  Discharge - 07/19/19 8:57:00 CDT, Home, Give all scheduled vaccinations prior to discharge. only if cleared by oncology?  Discharge Activity - Activity as Tolerated?  Discharge Diet - Regular?  Follow up  Follow-up with rheumatology ?1 week for Sjogren syndrome, within 1 week  ????Need to call and make appointment  Report to Emergency Department if symptoms return or worsen  Konstantin Armenta, on 07/25/2019  ????Office will call you with appointment dates & times.  Gia Sanderson, on 07/25/2019  Kilo Patel, on 07/29/2019

## 2022-05-03 NOTE — HISTORICAL [facility]
This is a historical note converted from [employer]. Formatting and pictures may have been removed.  Please reference [employer] for original formatting and attached multimedia. Chief Complaint  c/o abd pain to LUQ that radiates to back for 1 week. also c/o nausea and constipation. denies dysuria.  History of Present Illness  Patient is a pleasant 24-year-old female?with a reported past medical history possible Sjogrens syndrome (diagnosed recently per a?rheumatology panel ordered by her PCP), and ADHD, presented to the ER complaining of abdominal pain mostly in her left upper quadrant that radiates around to her back on the left.? She reports this is been present for several months worsening in the last week.? She also reported that she has been having night sweats, and with poor appetite with weight loss over the last several?months.? Also reports progressive dyspnea especially with lying flat over the last?several months. ?She states she initially got a lupus panel done because she has an extensive family history of lupus and was having diffuse joint pains and had a rash recently that is now resolved, but the panel came back showing she possibly has Sjogren syndrome.? She is not on any form of medication for rheumatological diseases.? On arrival to the ER she was tachycardic mildly, but afebrile and hemodynamically stable.? Laboratory work showed leukocytosis of 20,000, elevated total protein, and a CT of her abdomen was obtained which noted that there was a large left-sided pleural effusion, a small pericardial effusion, a left cardiac mass likely representing adenopathy, and extensive adenopathy in the mesentery and periaortic region.? Differential likely metastatic disease or lymphoproliferative disease.? Patient denies significant short of breath, cough or sputum production. ?She is being admitted to the hospital service with consultation of pulmonology for her large left pleural effusion and interventional radiology  for extensive adenopathy.  Review of Systems  General_negative except as stated above  HEENT_negative except as stated above  Neck_negative except as stated above  Respiratory_negative except as stated above  Cardiovascular_negative except as stated above  Gastrointestinal_negative except as stated above  Genitourinary_negative except as stated above  Musculoskeletal_negative except as stated above  Immunologic_negative except as stated above  Neurologic_negative except as stated above  Psychiatric_negative except as stated above  Physical Exam  Vitals & Measurements  T:?37.5? ?C (Oral)? TMIN:?37.1? ?C (Oral)? TMAX:?37.5? ?C (Oral)? HR:?110(Peripheral)? HR:?110(Monitored)? RR:?18? BP:?127/78? SpO2:?97%? WT:?72.8?kg?  GENERAL: awake, alert, oriented and in no acute distress  HEENT: normocephalic atraumatic?  NECK: supple?  LUNGS:?Decreased breath sounds on right, no rales or rhonchi, moving air well without resp distress  CVS: Regular rate and rhythm, normal peripheral perfusion  ABD: Soft, non-tender, non-distended, bowel sounds present  EXTREMITIES: no clubbing or cyanosis  SKIN: Warm, dry.?  NEURO: alert and oriented, grossly without focal deficits?  PSYCHIATRIC: Cooperative  ?  ?  Assessment/Plan  Abdominal pain, night sweats,?decreased appetite/weight loss x several months  New large left pleural effusion  Extensive?mesentery adenopathy, left cardiac and periaortic adenopathy  Leukocytosis  Small pericardial effusion  ?   - discussed imaging results with patient  - explained work-up including biopsies and thoracentesis  - obtain blood cx  - IR and pulmonology consulted  ?   DVT prophylaxis: SCDs  CODE STATUS: Full code  ?   Admission times: 71 minutes?   Problem List/Past Medical History  Ongoing  ADHD - Attention deficit disorder with hyperactivity  Procedure/Surgical History  none   Medications  Inpatient  IVF Normal Saline NS Bolus 1000ml 1,000 mL, 1000 mL, IV  Home  amphetamine-dextroamphetamine 30 mg oral  tablet, 30 mg= 1 tab(s), Oral, Daily  Allergies  penicillins?(unk)  Social History  Abuse/Neglect  No, 07/16/2019  Alcohol  Liquor, 1-2 times per month, Alcohol use interferes with work or home: No. Others hurt by drinking: No. Household alcohol concerns: No., 07/16/2019  Substance Use  Never, 08/11/2017  Tobacco  Never (less than 100 in lifetime), N/A, 07/16/2019  Family History  Extensive family history of lupus per patient  Lab Results  Chemistry? Hematology/Coagulation?   Sodium Lvl: 137 mmol/L (07/15/19 19:50:00) WBC:?20.3 x10(3)/mcL?High (07/15/19 19:50:00)   Potassium Lvl: 3.8 mmol/L (07/15/19 19:50:00) RBC: 4.57 x10(6)/mcL (07/15/19 19:50:00)   Chloride: 102 mmol/L (07/15/19 19:50:00) Hgb:?11.4 gm/dL?Low (07/15/19 19:50:00)   CO2: 31 mmol/L (07/15/19 19:50:00) Hct: 37.4 % (07/15/19 19:50:00)   Calcium Lvl: 9.7 mg/dL (07/15/19 19:50:00) Platelet:?481 x10(3)/mcL?High (07/15/19 19:50:00)   Glucose Lvl: 87 mg/dL (07/15/19 19:50:00) MCV: 81.8 fL (07/15/19 19:50:00)   BUN: 9 mg/dL (07/15/19 19:50:00) MCH:?24.9 pg?Low (07/15/19 19:50:00)   Creatinine: 0.73 mg/dL (07/15/19 19:50:00) MCHC:?30.5 gm/dL?Low (07/15/19 19:50:00)   eGFR-AA: >60 (07/15/19 19:50:00) RDW: 13.5 % (07/15/19 19:50:00)   eGFR-LILLIAM: >60 (07/15/19 19:50:00) MPV: 9.5 fL (07/15/19 19:50:00)   Bili Total: 0.3 mg/dL (07/15/19 19:50:00) Abs Neut:?17.22 x10(3)/mcL?High (07/15/19 19:50:00)   Bili Direct: 0.1 mg/dL (07/15/19 19:50:00) Neutro Auto: 85 % (07/15/19 19:50:00)   Bili Indirect: 0.2 mg/dL (07/15/19 19:50:00) Lymph Auto: 9 % (07/15/19 19:50:00)   AST:?14 unit/L?Low (07/15/19 19:50:00) Mono Auto: 5 % (07/15/19 19:50:00)   ALT: 23 unit/L (07/15/19 19:50:00) Eos Auto: 0 % (07/15/19 19:50:00)   Alk Phos:?196 unit/L?High (07/15/19 19:50:00) Abs Eos: 0.1 x10(3)/mcL (07/15/19 19:50:00)   Total Protein:?8.3 gm/dL?High (07/15/19 19:50:00) Basophil Auto: 0 % (07/15/19 19:50:00)   Albumin Lvl:?3.2 gm/dL?Low (07/15/19 19:50:00) Abs Neutro:?17.22  x10(3)/mcL?High (07/15/19 19:50:00)   Globulin:?5.1 gm/dL?High (07/15/19 19:50:00) Abs Lymph: 1.9 x10(3)/mcL (07/15/19 19:50:00)   A/G Ratio:?0.6 ratio?Low (07/15/19 19:50:00) Abs Mono: 1 x10(3)/mcL (07/15/19 19:50:00)   BNP: 11 pg/mL (07/15/19 19:50:00) Abs Baso: 0 x10(3)/mcL (07/15/19 19:50:00)   Lipase Lvl: 88 unit/L (07/15/19 19:50:00)    U Beta hCG Ql: Negative (07/15/19 19:34:00)    TSH: 1.38 mIU/L (07/15/19 19:50:00)    Diagnostic Results  Accession:?[id number]  Order:?CT Abdomen and Pelvis W Contrast  Report Dt/Tm:?07/15/2019 21:38  Report:?  CT of the abdomen and pelvis without oral after IV contrast only  ?  Clinical history is abdominal pain  ?  There is a large left-sided pleural effusion with associated  atelectasis. There is a small pericardial effusion. There is a left  periaortic density measuring 5.8 x 6 cm this may represent adenopathy.  There are several focal lesions in the spleen the first measures 11 mm  the second measures 15. Pancreas appears normal. Biliary system  appears normal. Kidneys appear normal. The adrenals are not enlarged.  Aorta shows no evidence of an aneurysm. There is a small amount of  free fluid in the pelvis. Uterus appears normal. The appendix appears  normal. There is significant periaortic and base of the mesentery  adenopathy.  ?  IMPRESSION: Large left-sided pleural effusion.  ?  Small pericardial effusion.  ?  Left Cardiac mass most likely represents adenopathy.  ?  Extensive adenopathy in the mesentery and periaortic region.  Differential would include metastatic disease and lymphoproliferative  disease  ?  ?

## 2022-06-24 DIAGNOSIS — C81.90 HODGKIN LYMPHOMA, UNSPECIFIED HODGKIN LYMPHOMA TYPE, UNSPECIFIED BODY REGION: Primary | ICD-10-CM
